# Patient Record
Sex: FEMALE | Race: WHITE | NOT HISPANIC OR LATINO | Employment: OTHER | URBAN - METROPOLITAN AREA
[De-identification: names, ages, dates, MRNs, and addresses within clinical notes are randomized per-mention and may not be internally consistent; named-entity substitution may affect disease eponyms.]

---

## 2020-06-12 ENCOUNTER — NEW PATIENT COMPREHENSIVE (OUTPATIENT)
Dept: URBAN - METROPOLITAN AREA CLINIC 36 | Facility: CLINIC | Age: 85
End: 2020-06-12

## 2020-06-12 DIAGNOSIS — H52.7: ICD-10-CM

## 2020-06-12 PROCEDURE — 92015 DETERMINE REFRACTIVE STATE: CPT

## 2020-06-12 PROCEDURE — 92004 COMPRE OPH EXAM NEW PT 1/>: CPT

## 2020-06-12 ASSESSMENT — VISUAL ACUITY
OD_SC: >20/200
OS_CC: J1+
OD_SC: J1
OS_CC: 20/25-1
OS_SC: J3
OD_CC: J1
OS_SC: 20/25
OD_CC: 20/40+1

## 2020-06-12 ASSESSMENT — TONOMETRY
OS_IOP_MMHG: 12
OD_IOP_MMHG: 12

## 2023-05-03 ENCOUNTER — COMPREHENSIVE EXAM (OUTPATIENT)
Dept: URBAN - METROPOLITAN AREA CLINIC 36 | Facility: CLINIC | Age: 88
End: 2023-05-03

## 2023-05-03 DIAGNOSIS — H35.371: ICD-10-CM

## 2023-05-03 DIAGNOSIS — H04.123: ICD-10-CM

## 2023-05-03 PROCEDURE — 92015 DETERMINE REFRACTIVE STATE: CPT

## 2023-05-03 PROCEDURE — 92014 COMPRE OPH EXAM EST PT 1/>: CPT

## 2023-05-03 ASSESSMENT — VISUAL ACUITY
OD_SC: 20/400
OS_SC: J5
OS_CC: 20/30
OS_CC: J5
OS_SC: 20/40
OD_CC: 20/40
OD_CC: J3
OD_SC: J1

## 2023-05-03 ASSESSMENT — TONOMETRY
OS_IOP_MMHG: 16
OD_IOP_MMHG: 16

## 2023-06-26 ENCOUNTER — EMERGENCY VISIT (OUTPATIENT)
Dept: URBAN - METROPOLITAN AREA CLINIC 46 | Facility: CLINIC | Age: 88
End: 2023-06-26

## 2023-06-26 DIAGNOSIS — S05.01XA: ICD-10-CM

## 2023-06-26 DIAGNOSIS — Z96.1: ICD-10-CM

## 2023-06-26 PROCEDURE — 92012 INTRM OPH EXAM EST PATIENT: CPT

## 2023-06-26 PROCEDURE — 92071 CONTACT LENS FITTING FOR TX: CPT

## 2023-06-26 ASSESSMENT — TONOMETRY
OS_IOP_MMHG: 16
OD_IOP_MMHG: 16

## 2023-06-26 ASSESSMENT — VISUAL ACUITY
OD_CC: 20/50
OS_CC: 20/30-1

## 2023-06-29 ENCOUNTER — EMERGENCY VISIT (OUTPATIENT)
Dept: URBAN - METROPOLITAN AREA CLINIC 36 | Facility: CLINIC | Age: 88
End: 2023-06-29

## 2023-06-29 DIAGNOSIS — S05.01XD: ICD-10-CM

## 2023-06-29 PROCEDURE — 92012 INTRM OPH EXAM EST PATIENT: CPT

## 2023-06-29 RX ORDER — AMOXICILLIN 500 MG/1: 1 CAPSULE ORAL

## 2023-06-29 ASSESSMENT — VISUAL ACUITY
OD_PH: 20/50
OS_CC: 20/30-2
OD_CC: 20/70-2

## 2023-06-29 ASSESSMENT — TONOMETRY
OS_IOP_MMHG: 16
OD_IOP_MMHG: 16

## 2023-07-11 ENCOUNTER — ESTABLISHED PATIENT (OUTPATIENT)
Dept: URBAN - METROPOLITAN AREA CLINIC 36 | Facility: CLINIC | Age: 88
End: 2023-07-11

## 2023-07-11 DIAGNOSIS — S05.01XD: ICD-10-CM

## 2023-07-11 PROCEDURE — 92012 INTRM OPH EXAM EST PATIENT: CPT

## 2023-07-11 ASSESSMENT — TONOMETRY: OS_IOP_MMHG: 15

## 2023-07-11 ASSESSMENT — VISUAL ACUITY
OS_SC: 20/30+1
OD_SC: 20/80-2

## 2024-05-07 ENCOUNTER — COMPREHENSIVE EXAM (OUTPATIENT)
Dept: URBAN - METROPOLITAN AREA CLINIC 36 | Facility: CLINIC | Age: 89
End: 2024-05-07

## 2024-05-07 DIAGNOSIS — H35.371: ICD-10-CM

## 2024-05-07 DIAGNOSIS — H52.7: ICD-10-CM

## 2024-05-07 DIAGNOSIS — H04.123: ICD-10-CM

## 2024-05-07 PROCEDURE — 92014 COMPRE OPH EXAM EST PT 1/>: CPT

## 2024-05-07 PROCEDURE — 92134 CPTRZ OPH DX IMG PST SGM RTA: CPT

## 2024-05-07 PROCEDURE — 92015 DETERMINE REFRACTIVE STATE: CPT

## 2024-05-07 ASSESSMENT — VISUAL ACUITY
OD_CC: J8
OS_CC: J8
OS_CC: 20/40
OU_CC: J8
OD_PH: 20/50
OS_PH: 20/25
OD_CC: 20/80

## 2024-05-07 ASSESSMENT — TONOMETRY
OS_IOP_MMHG: 17
OD_IOP_MMHG: 18

## 2025-05-08 ENCOUNTER — COMPREHENSIVE EXAM (OUTPATIENT)
Age: 89
End: 2025-05-08

## 2025-05-08 DIAGNOSIS — H04.123: ICD-10-CM

## 2025-05-08 DIAGNOSIS — H50.10: ICD-10-CM

## 2025-05-08 DIAGNOSIS — H52.7: ICD-10-CM

## 2025-05-08 DIAGNOSIS — H35.371: ICD-10-CM

## 2025-05-08 PROCEDURE — 92134 CPTRZ OPH DX IMG PST SGM RTA: CPT

## 2025-05-08 PROCEDURE — 92015 DETERMINE REFRACTIVE STATE: CPT

## 2025-05-08 PROCEDURE — 92014 COMPRE OPH EXAM EST PT 1/>: CPT
